# Patient Record
Sex: MALE | Race: WHITE | NOT HISPANIC OR LATINO | URBAN - METROPOLITAN AREA
[De-identification: names, ages, dates, MRNs, and addresses within clinical notes are randomized per-mention and may not be internally consistent; named-entity substitution may affect disease eponyms.]

---
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
## 2025-05-28 NOTE — ED PEDIATRIC NURSE NOTE - FINAL NURSING ELECTRONIC SIGNATURE
Lab orders were received from [facility] for AST and ALT and given to lab. Writer attempted to contact [facility] neurology Dr. Mosley office and was not able to get a person on the phone.     Spoke to patient's mother Rhoda to confirm these are the labs that need to be completed. She stated the AST and ALT are the only labs that need to be completed at this time. She had no other questions or concerns.    21-Nov-2018 14:46